# Patient Record
Sex: FEMALE | Race: WHITE
[De-identification: names, ages, dates, MRNs, and addresses within clinical notes are randomized per-mention and may not be internally consistent; named-entity substitution may affect disease eponyms.]

---

## 2017-10-18 ENCOUNTER — HOSPITAL ENCOUNTER (OUTPATIENT)
Dept: HOSPITAL 39 - GMA | Age: 52
End: 2017-10-18
Attending: PHYSICIAN ASSISTANT
Payer: COMMERCIAL

## 2017-10-18 DIAGNOSIS — N30.00: Primary | ICD-10-CM

## 2017-12-19 ENCOUNTER — HOSPITAL ENCOUNTER (OUTPATIENT)
Dept: HOSPITAL 39 - RAD | Age: 52
Discharge: HOME | End: 2017-12-19
Attending: ORTHOPAEDIC SURGERY
Payer: COMMERCIAL

## 2017-12-19 DIAGNOSIS — M25.511: Primary | ICD-10-CM

## 2017-12-19 NOTE — RAD
EXAM DESCRIPTION:

Shoulder,Right 2 or More Views



CLINICAL HISTORY:

52 years Female, PAIN



COMPARISON:

None.



FINDINGS:

4 views of the right shoulder show no acute fracture or

malalignment. The right AC joint is unremarkable. There is no

soft tissue abnormality. The joint spaces are well-maintained and

there is no focal bone lesion.



IMPRESSION:

Negative exam.



Electronically signed by:  Jose Saxena MD  12/19/2017 3:20 PM [facility]

Workstation: [id number]

## 2020-01-06 ENCOUNTER — HOSPITAL ENCOUNTER (OUTPATIENT)
Dept: HOSPITAL 39 - MAMMO | Age: 55
End: 2020-01-06
Attending: OBSTETRICS & GYNECOLOGY
Payer: COMMERCIAL

## 2020-01-06 DIAGNOSIS — Z12.31: Primary | ICD-10-CM

## 2020-01-07 NOTE — MAM
EXAM DESCRIPTION: 

3D Screening BILATERAL : Digital Mammography.



CLINICAL HISTORY: 

54 years Female ANNUAL SCREENING . No complaints. No personal or

family history of breast cancer. Menarche age 15. Childbirth age

22. Menopause age 50. No HRT. Lifetime risk of developing breast

cancer (Tyrer-Cuzick model)(%):  7.8.



COMPARISON: 

2-D digital screening bilateral mammography April 2015..  No

prior reports available.  



TECHNIQUE: 

Bilateral CC and MLO projection full-field images, digital

tomosynthesis mammographic technique. Bilateral digital 2-D

full-field MLO images.  CAD not available for tomosynthesis or

2-D images.  



FINDINGS: 

The breast parenchymal density pattern is: Scattered areas of

fibroglandular density. No skin thickening or nipple retraction. 

Bilateral solitary microcalcifications.

No new focal, stellate mass or density, focal asymmetry , and no

suspicious microcalcifications bilaterally. Stable mammograms

compared to prior study.  Taking into account, differences in

mammographic technique.



IMPRESSION: 

Benign exam.



BIRAD CATEGORY: 2 BENIGN FINDINGS.



RECOMMENDATIONS:

FOLLOW UP: Routine digital bilateral  mammographic screening, one

year interval from  January 2019.



Written communication explaining the IMPRESSION and follow-up,

will be mailed to the patient and referring health care provider.



According to the [employer], yearly mammograms

are recommended starting at age 40 and continuing as long as a

woman is in good health.  Any breast change noted on a breast

self-exam should be reported promptly to the patient's healthcare

provider.  Breast MRI is recommended for women with an

approximately 20-25% or greater lifetime risk of breast cancer,

including women with a strong family history of breast or ovarian

cancer and women who have been treated for Hodgkin's disease.



A negative mammographic report should not delay tissue diagnosis

in patients with significant clinical history or physical

findings.



Extremely dense breast tissue limits the sensitivity of digital

mammography. 



Electronically signed by:  Elan Luevano MD  1/7/2020 9:01 PM [facility]

Workstation: [id number]

## 2020-05-12 ENCOUNTER — HOSPITAL ENCOUNTER (OUTPATIENT)
Dept: HOSPITAL 39 - GMAJ | Age: 55
End: 2020-05-12
Attending: FAMILY MEDICINE
Payer: COMMERCIAL

## 2020-05-12 DIAGNOSIS — E55.9: ICD-10-CM

## 2020-05-12 DIAGNOSIS — E89.0: Primary | ICD-10-CM

## 2020-07-14 ENCOUNTER — HOSPITAL ENCOUNTER (OUTPATIENT)
Dept: HOSPITAL 39 - GMAJ | Age: 55
End: 2020-07-14
Attending: FAMILY MEDICINE
Payer: COMMERCIAL

## 2020-07-14 DIAGNOSIS — R63.5: Primary | ICD-10-CM

## 2020-07-14 DIAGNOSIS — C73: ICD-10-CM

## 2020-09-29 ENCOUNTER — HOSPITAL ENCOUNTER (OUTPATIENT)
Dept: HOSPITAL 39 - GMAJ | Age: 55
End: 2020-09-29
Attending: FAMILY MEDICINE
Payer: COMMERCIAL

## 2020-09-29 DIAGNOSIS — R73.9: ICD-10-CM

## 2020-09-29 DIAGNOSIS — C73: Primary | ICD-10-CM

## 2020-09-29 DIAGNOSIS — E55.9: ICD-10-CM

## 2020-09-29 DIAGNOSIS — Z79.899: ICD-10-CM

## 2020-11-06 ENCOUNTER — HOSPITAL ENCOUNTER (OUTPATIENT)
Dept: HOSPITAL 39 - AMB | Age: 55
Discharge: HOME | End: 2020-11-06
Attending: SPECIALIST
Payer: COMMERCIAL

## 2020-11-06 VITALS — SYSTOLIC BLOOD PRESSURE: 134 MMHG | DIASTOLIC BLOOD PRESSURE: 82 MMHG | TEMPERATURE: 96.8 F

## 2020-11-06 VITALS — OXYGEN SATURATION: 98 %

## 2020-11-06 DIAGNOSIS — M17.0: ICD-10-CM

## 2020-11-06 DIAGNOSIS — Z12.11: Primary | ICD-10-CM

## 2020-11-06 DIAGNOSIS — Z90.710: ICD-10-CM

## 2020-11-06 DIAGNOSIS — Z79.899: ICD-10-CM

## 2020-11-06 DIAGNOSIS — Z85.850: ICD-10-CM

## 2020-11-06 DIAGNOSIS — Z88.0: ICD-10-CM

## 2020-11-06 DIAGNOSIS — E89.0: ICD-10-CM

## 2020-11-06 DIAGNOSIS — K64.8: ICD-10-CM

## 2020-11-06 PROCEDURE — 45378 DIAGNOSTIC COLONOSCOPY: CPT

## 2020-11-06 PROCEDURE — 00812 ANES LWR INTST SCR COLSC: CPT

## 2020-11-06 NOTE — OP
DATE OF PROCEDURE:  11/06/20



PREOPERATIVE DIAGNOSIS: 

1.  Screening colonoscopy.



POSTOPERATIVE DIAGNOSIS: 

1.  Normal colon.



PROCEDURE: 

1.  Colonoscopy.



SURGEON:  Omar Marshall MD



ANESTHESIA: General, Ronald Goldstein CRNA.



INDICATION:  As stated.



PROCEDURE:  General anesthesia was induced in the lateral position.  Digital 
rectal exam was normal.  The colonoscope was inserted with minimal difficulty 
all the way to the cecum as identified by landmarks.  Upon withdrawal, all 
mucosal surfaces appeared normal.  No polyps were seen.  Retroflexion was also 
normal with small internal hemorrhoids.  She tolerated the procedure and had a 
completely normal scope.  Routine followup in 10 years without any new problems.



#[id number]



cc:  Omar Bustillos MD

[facility]

## 2020-11-18 ENCOUNTER — HOSPITAL ENCOUNTER (OUTPATIENT)
Dept: HOSPITAL 39 - GMAJ | Age: 55
End: 2020-11-18
Attending: FAMILY MEDICINE
Payer: COMMERCIAL

## 2020-11-18 DIAGNOSIS — C73: Primary | ICD-10-CM

## 2020-11-18 DIAGNOSIS — E55.9: ICD-10-CM

## 2021-01-28 ENCOUNTER — HOSPITAL ENCOUNTER (OUTPATIENT)
Dept: HOSPITAL 39 - GMAJ | Age: 56
End: 2021-01-28
Attending: FAMILY MEDICINE
Payer: COMMERCIAL

## 2021-01-28 DIAGNOSIS — E03.8: Primary | ICD-10-CM
